# Patient Record
Sex: MALE | URBAN - METROPOLITAN AREA
[De-identification: names, ages, dates, MRNs, and addresses within clinical notes are randomized per-mention and may not be internally consistent; named-entity substitution may affect disease eponyms.]

---

## 2023-01-19 ENCOUNTER — TELEPHONE (OUTPATIENT)
Dept: OTHER | Facility: OTHER | Age: 23
End: 2023-01-19

## 2023-01-19 NOTE — TELEPHONE ENCOUNTER
Spoke to pt in regards to ins, he mentioned he wants a dr with a calm approach (holistic) was advised to schedule w/prasanth in [facility] for pt

## 2023-01-19 NOTE — TELEPHONE ENCOUNTER
Call from patient requesting call back to schedule an appointment with Dr Jorge Luis Mathews for a cyst on testicles  He would first like to check if the office is in network with [facility]  Please return his call

## 2023-01-23 NOTE — TELEPHONE ENCOUNTER
Please Triage  New Patient    What is the reason for the patient’s appointment? Cyst on L testicle   What office location does the patient prefer? Imaging/Lab Results: in epic     Do we accept the patient's insurance or is the patient Self-Pay? Insurance Provider:[employer]   Plan Type/Number:  Member ID#: [id number]  Group # [id number]    Has the patient had any previous Urologist(s)? No   Have patient records been requested? If not are records showing in Epic:   In epic   Has the patient had any outside testing done? In [facility]   Does the patient have a personal history of cancer?  No

## 2023-02-27 ENCOUNTER — TELEPHONE (OUTPATIENT)
Dept: OTHER | Facility: OTHER | Age: 23
End: 2023-02-27

## 2023-02-27 NOTE — TELEPHONE ENCOUNTER
Impression    IMPRESSION:   1  Palpable area of concern in the left testicle corresponds to a 3 mm simple tunica albuginea cyst      2  Borderline size of vessels bilaterally, which do not meet criteria for varicocele  Minimal heterogeneity of the testicular echotexture is of uncertain significance  Ok to be r/s until June?

## 2023-02-27 NOTE — TELEPHONE ENCOUNTER
Patient calling in today, he would like to cancel tomorrow apts and reschedule for Saima  Please call him back